# Patient Record
Sex: FEMALE | Race: WHITE | Employment: UNEMPLOYED
[De-identification: names, ages, dates, MRNs, and addresses within clinical notes are randomized per-mention and may not be internally consistent; named-entity substitution may affect disease eponyms.]

---

## 2024-02-22 ENCOUNTER — HOSPITAL ENCOUNTER (OUTPATIENT)
Facility: HOSPITAL | Age: 74
Setting detail: RECURRING SERIES
Discharge: HOME | End: 2024-02-25
Payer: MEDICARE

## 2024-02-22 PROCEDURE — 97162 PT EVAL MOD COMPLEX 30 MIN: CPT

## 2024-02-22 NOTE — PROGRESS NOTES
to improve patient's ability to progress to PLOF and address remaining functional goals.  (see flow sheet as applicable)     Details if applicable:       [facility] Totals Reminder: bill using total billable min of TIMED therapeutic procedures (example: do not include dry needle or estim unattended, both untimed codes, in totals to left)  8-22 min = 1 unit; 23-37 min = 2 units; 38-52 min = 3 units; 53-67 min = 4 units; 68-82 min = 5 units   Total Total     [x]  Patient Education billed concurrently with other procedures   [x] Review HEP    [] Progressed/Changed HEP, detail:    [] Other detail:       Objective Information/Functional Measures/Assessment    See POC    Patient will continue to benefit from skilled PT / OT services to modify and progress therapeutic interventions, analyze and address functional mobility deficits, analyze and address ROM deficits, analyze and address strength deficits, analyze and address soft tissue restrictions, analyze and cue for proper movement patterns, analyze and modify for postural abnormalities, and instruct in home and community integration to address functional deficits and attain remaining goals.    Progress toward goals / Updated goals:  [x]  See POC    See POC    PLAN  yes Continue plan of care  [x]  Upgrade activities as tolerated  []  Discharge due to :  []  Other:    Esequiel Thomson PT    2/22/2024    8:55 AM    Future Appointments   Date Time Provider Department Center   2/22/2024  9:00 AM Esequiel Thomson, [facility]

## 2024-02-22 NOTE — THERAPY EVALUATION
Goals: To be accomplished in 4 weeks  Independent with HEP to progress to meet goals.  2. Pt to report decreased max pain levels less than 5/10 for improvement in ADLs.  3. Pt to demonstrate SB to the R to 20 deg to signify improved ROM and segmental mobility for safe driving.    Long Term Goals: To be accomplished in 8 weeks  Improve FOTO score to 65/100 to show a significant functional change.  2. Pt to report decreased max pain levels less than 2/10 for improvement in QoL.  3. Pt to demonstrate rotation bilat to 60 to improve safety with driving.    Frequency / Duration: Patient to be seen 1-2 times per week for 8 weeks    Patient/ Caregiver education and instruction: Diagnosis, prognosis, self care, activity modification, and exercises [x]  Plan of care has been reviewed with PTA    Certification Period: 2/22/2024 -  4/20/24    Esequiel Thomson, PT       2/22/2024       8:56 AM    Payor: [employer] MEDICARE / Plan: [employer] GOLD PLUS HMO / Product Type: *No Product type* /     Physician signature required for Medicare, Medicaid, Worker's Comp, Direct Access   ===================================================================  I certify that the above Therapy Services are being furnished while the patient is under my care. I agree with the treatment plan and certify that this therapy is necessary.    Physician's Signature:_________________________   DATE:_________   TIME:________                           Ingris Gutierrez, DO    ** Signature, Date and Time must be completed for valid certification **  Please sign and fax to [facility]. Thank you

## 2024-03-01 ENCOUNTER — HOSPITAL ENCOUNTER (OUTPATIENT)
Facility: HOSPITAL | Age: 74
Setting detail: RECURRING SERIES
Discharge: HOME | End: 2024-03-04
Payer: MEDICARE

## 2024-03-01 PROCEDURE — 97140 MANUAL THERAPY 1/> REGIONS: CPT

## 2024-03-01 PROCEDURE — 97110 THERAPEUTIC EXERCISES: CPT

## 2024-03-01 NOTE — THERAPY RECERTIFICATION
[facility]  [address], [address],[place] [address] - Ph: [phone]  Fx: [phone]  PHYSICAL THERAPY PROGRESS NOTE  Patient Name: Shilpa Carlton : 1950   Treatment/Medical Diagnosis: Pain in neck [M54.2]   Referral Source: Ingris Gutierrez DO     Date of Initial Visit: 24 Attended Visits: 2 Missed Visits: CX 0  NS 0     SUMMARY OF TREATMENT  Patient has been evaluated and assessed for Pain in neck [M54.2]. PT interventions have included therapeutic exercises, neuromuscular re-ed, therapeutic functional activity, received manual therapy intervention, self-care strategies, HEP techniques and pt ed on condition consistency and follow-through.       CURRENT STATUS  ***    Current objective findings:   Palpation:  TTP along R>L cervical paraspinals, suboccipitals, R scalenes, R SCM. (Current: continued more tenderness and tissue restriction in R>L)     Cervical AROM   Flexion 48 with strong diffuse ache (current 50 with moderate ache)  Extension 26 (current 35),   RSB 9 with strong stiffness (current 20 with strong stiffness)   LSB 25 (current 25 with moderate stiffness),   Rrot 65 with ache (current 40 with moderate stiffness and ache)   L rot 35 with ache (current 30 with moderate stiffness and ache) .     Payor: [employer] MEDICARE / Plan: [employer] GOLD PLUS HMO / Product Type: *No Product type* /     Medicare, cannot change goals, cannot adjust frequency/duration, no signature required   Reporting Period: (date from last Prog Note/Eval to current Prog Note/Recert)  *** - ***    Certification Period: ***    RECOMMENDATIONS  Continue therapy per initial Plan of Care or most recent Medicare Recert.      If you have any questions/comments please contact us directly.  Thank you for allowing us to assist in the care of your patient.    Pattie Howell, PTA       3/1/2024       1:56 PM

## 2024-03-01 NOTE — PROGRESS NOTES
PHYSICAL / OCCUPATIONAL THERAPY - DAILY TREATMENT NOTE (updated )    Patient Name: Shilpa Carlton    Date: 3/1/2024    : 1950  Insurance: Payor: [employer]ICARE / Plan: [employer] GOLD PLUS HMO / Product Type: *No Product type* /      Patient  verified yes     Visit #   Current / Total 2 16   Time   In / Out 1020 AM 1114 AM   Pain   In / Out 3/10 2/10   Subjective Functional Status/Changes: Patient reports no change in symptoms since IE. State she was doing her HEP until  because her LEFT arm is hurting. Reports having a near fall on  (24) when she tripped over her little dog. Landed into the front door with her left shoulder. Notices more pain and difficulty with c/s rotation, more pain with R>L rotation.      TREATMENT AREA =  Pain in neck [M54.2]      OBJECTIVE         Therapeutic Procedures:  Tx Min Billable or 1:1 Min (if diff from Tx Min) Procedure, Rationale, Specifics   Total  54 Total   [facility] Totals Reminder: bill using total billable min of TIMED therapeutic procedures (example: do not include dry needle or estim unattended, both untimed codes, in totals to left)  8-22 min = 1 unit; 23-37 min = 2 units; 38-52 min = 3 units; 53-67 min = 4 units; 68-82 min = 5 units   30  [id number] Manual Therapy (timed):  decrease pain, increase ROM, increase tissue extensibility, decrease trigger points, and increase postural awareness to improve patient's ability to progress to PLOF and address remaining functional goals.  The manual therapy interventions were performed at a separate and distinct time from the therapeutic activities interventions . (see flow sheet as applicable)    Details if applicable  SUPINE: gentle C/S PROM with slight c/s distraction; STM along c/s para, across suboccipitals, R>L SCM     24  [id number] Therapeutic Exercise (timed):  increase ROM, strength, coordination, balance, and proprioception to improve patient's ability to progress to PLOF and address remaining functional

## 2024-03-06 ENCOUNTER — APPOINTMENT (OUTPATIENT)
Facility: HOSPITAL | Age: 74
End: 2024-03-06
Payer: MEDICARE

## 2024-04-15 ENCOUNTER — TELEPHONE (OUTPATIENT)
Facility: HOSPITAL | Age: 74
End: 2024-04-15

## 2024-04-15 NOTE — TELEPHONE ENCOUNTER
called pt - google assist answered - unable to speak to pt or leave a message about no show this morning. She has no more scheduled appointments.